# Patient Record
Sex: FEMALE | Race: ASIAN | Employment: UNEMPLOYED | URBAN - METROPOLITAN AREA
[De-identification: names, ages, dates, MRNs, and addresses within clinical notes are randomized per-mention and may not be internally consistent; named-entity substitution may affect disease eponyms.]

---

## 2021-04-26 ENCOUNTER — HOSPITAL ENCOUNTER (OUTPATIENT)
Dept: GENERAL RADIOLOGY | Age: 31
Discharge: HOME | End: 2021-04-26
Payer: COMMERCIAL

## 2021-04-26 DIAGNOSIS — Z31.41 FERTILITY TESTING: ICD-10-CM

## 2021-04-26 PROCEDURE — 74740 X-RAY FEMALE GENITAL TRACT: CPT

## 2021-04-26 PROCEDURE — 6360000004 HC RX CONTRAST MEDICATION

## 2021-04-26 RX ADMIN — IOPAMIDOL 20 ML: 408 INJECTION, SOLUTION INTRATHECAL at 08:45

## 2021-05-11 LAB
FOLLICLE STIMULATING HORMONE: 11 MIU/ML
PROGESTERONE LEVEL: 0.9 NG/ML
PROLACTIN: 26.3 NG/ML
TSH SERPL DL<=0.05 MIU/L-ACNC: 1.74 UIU/ML (ref 0.27–4.2)

## 2022-05-31 ENCOUNTER — HOSPITAL ENCOUNTER (OUTPATIENT)
Dept: DIABETES SERVICES | Age: 32
Setting detail: THERAPIES SERIES
Discharge: HOME | End: 2022-05-31
Payer: COMMERCIAL

## 2022-05-31 DIAGNOSIS — O24.419 GESTATIONAL DIABETES MELLITUS (GDM), ANTEPARTUM, GESTATIONAL DIABETES METHOD OF CONTROL UNSPECIFIED: Primary | ICD-10-CM

## 2022-05-31 PROCEDURE — G0109 DIAB MANAGE TRN IND/GROUP: HCPCS

## 2022-05-31 NOTE — LETTER
Diabetes Education  [facility])    TO: Cathie Valdez    RE: Lei Galvan  : 1990    Your patient was seen virtually for Gestational Diabetes Education on 22. EDUCATION INCLUDED:   [x]  Definition of Gestational Diabetes                                                                                 [x]  Risk Factors/Acute Complications     [x]  Blood Glucose Monitoring        [x]  Exercise/Activity    [x]  Carbohydrate Counting  [x]  Timing of Meals    [x]  Food Logs  [x]  Eating Out  [x]  Nutrition Precautions for Pregnancy  [x]  Use of Blood glucose meter:       Recent Blood glucose : FB; 88  [x]  PHQ2 Depression Screen; patient denies depression. PATIENT GOALS:  [x]  Follow meal plan with appropriate Carbohydrate intake:   Breakfast: 15 - 30 g Carb    Lunch: 30 - 45 g Carb   Dinner:  45 - 60 g Carb    3 snacks: 15 - 30 g Carb each  [x] Monitor blood glucose four times daily. FBG Goal: 60 - 95 or as recommended by physician   1hr PP <140 or as recommended by physician   2 hr PP <120 or as recommended by physician  [x] Exercise    [x] Log glucose results and bring to physicians office at each scheduled appointment  [] Other:     PLAN:  [x] Return for follow-up virtual visit to review Food/Log      Thank you for the opportunity to provide Diabetes Self Management Education to your patient.     Espinoza Moran RD, LD  Diabetes Educator

## 2022-06-07 ENCOUNTER — HOSPITAL ENCOUNTER (OUTPATIENT)
Dept: DIABETES SERVICES | Age: 32
Setting detail: THERAPIES SERIES
Discharge: HOME | End: 2022-06-07
Payer: COMMERCIAL

## 2022-06-07 DIAGNOSIS — O24.419 GESTATIONAL DIABETES MELLITUS (GDM), ANTEPARTUM, GESTATIONAL DIABETES METHOD OF CONTROL UNSPECIFIED: Primary | ICD-10-CM

## 2022-06-07 PROCEDURE — G0108 DIAB MANAGE TRN  PER INDIV: HCPCS

## 2022-06-07 NOTE — PROGRESS NOTES
Gestational Diabetes Patient Assessment and Education    Name: Vita Caballero : 1990  EDC:22        Education Completed  [x]  Definition of Gestational Diabetes                                                                                 [x]  Risk Factors/Acute Complications     [x]  Blood Glucose Monitoring--schedule, target levels     [x]  Exercise/Activity    [x]  Carbohydrate Counting  [x]  Timing of Meals    [x]  Food Logs  [x]  Eating Out  [x]  Nutrition Precautions for Pregnancy  [x]  Use of Blood glucose meter            Patient Goals  [x]  Follow meal plan with appropriate Carbohydrate intake:   Breakfast: 30 g Carb    Lunch: 30 - 45 g Carb   Dinner:  45 - 60 g Carb    3 snacks: 15 - 30 g Carb each  [x] Monitor blood glucose four times daily.     FBG Goal: 60 - 95 or as recommended by physician   1hr PP <140 or as recommended by physician   2 hr PP <120 or as recommended by physician  [x] Exercise    [x] Log glucose results and bring to physicians office at each scheduled appointment  [] Other:     Plan  [x] Return for follow-up visit to review Food/Log      Referring Provider: BEVERLEY Rocha    Total participants in Group:120 minutes telephone group

## 2022-06-07 NOTE — LETTER
Diabetes Education  [facility])    TO: Kale Salinas    RE: Jesús Barnhart  : 1990    Your patient attended a Virtual Follow-up session for Gestational Diabetes on 22. Education included the following:   [x] Review of BG Log   Fasting BG Range:79;81;85     1 hr PP BG Range: 120;149;152      2 hr PP BG Range:                                                             [x] Review of Food Log- carbs often exceeds recs. [] Additional Resources  [x]  Diet guidelines for sick days  [x] Post-Partum Guidelines  [x] Other: limit breads and sub sugar substitute for real sugar in tea    Post Program Recommendations include:  [x] Diabetes Screening 6 - 12 weeks post partum  [] Pre-Diabetes Group Class   [] Other : Thank you for the opportunity to provide Diabetes Education to your patient.     Sophia Conley, RD, LD    [facility] ([facility]) Diabetes Educator

## 2022-06-08 NOTE — PROGRESS NOTES
Gestational Diabetes Patient Assessment and Education    Name: Bairon Arias : 1990  EDC:22    BG/FOOD LOG REVIEW:  [x]  BG Log reviewed: FBG range: 79;81;85   PP BG range: 120;149;152  [x]  Other:carb intake at times exceeds recs given.  Uses real sugar in tea    Education Completed  [x]  Problem Solving   [x]  Carbohydrate Counting  [x]  Timing of Meals    [x]  Post-Partum Guidelines  [x]  Diet guidelines for sick days  []  Additional Resources  [x]  Other: limit starch foods; breads etc.substitute Sugar sub for real sugar in tea    Participant selected Post-Program Diabetes Self-Management Support Plan:   [] Nurse appointment  [] Dietitian appointment  [x] Follow-up with Referring Provider  [] Other:       Referring Provider: Kady Harrison  Total time spent with patient: 30 minute telephone